# Patient Record
Sex: MALE | Race: WHITE | NOT HISPANIC OR LATINO | Employment: FULL TIME | URBAN - METROPOLITAN AREA
[De-identification: names, ages, dates, MRNs, and addresses within clinical notes are randomized per-mention and may not be internally consistent; named-entity substitution may affect disease eponyms.]

---

## 2019-02-05 ENCOUNTER — APPOINTMENT (EMERGENCY)
Dept: RADIOLOGY | Facility: HOSPITAL | Age: 39
End: 2019-02-05
Payer: COMMERCIAL

## 2019-02-05 ENCOUNTER — HOSPITAL ENCOUNTER (EMERGENCY)
Facility: HOSPITAL | Age: 39
Discharge: HOME | End: 2019-02-05
Attending: EMERGENCY MEDICINE | Admitting: EMERGENCY MEDICINE
Payer: COMMERCIAL

## 2019-02-05 ENCOUNTER — APPOINTMENT (EMERGENCY)
Dept: NON INVASIVE DIAGNOSTICS | Facility: HOSPITAL | Age: 39
End: 2019-02-05
Payer: COMMERCIAL

## 2019-02-05 VITALS
HEART RATE: 59 BPM | HEIGHT: 69 IN | RESPIRATION RATE: 18 BRPM | BODY MASS INDEX: 29.55 KG/M2 | SYSTOLIC BLOOD PRESSURE: 122 MMHG | TEMPERATURE: 98.4 F | DIASTOLIC BLOOD PRESSURE: 87 MMHG | WEIGHT: 199.52 LBS | OXYGEN SATURATION: 99 %

## 2019-02-05 DIAGNOSIS — I31.9 PERICARDITIS: Primary | ICD-10-CM

## 2019-02-05 LAB
ALBUMIN SERPL BCP-MCNC: 4 G/DL (ref 3.5–5)
ALP SERPL-CCNC: 78 U/L (ref 46–116)
ALT SERPL W P-5'-P-CCNC: 37 U/L (ref 12–78)
ANION GAP SERPL CALCULATED.3IONS-SCNC: 8 MMOL/L (ref 4–13)
AST SERPL W P-5'-P-CCNC: 26 U/L (ref 5–45)
ATRIAL RATE: 62 BPM
BASOPHILS # BLD AUTO: 0.02 THOUSANDS/ΜL (ref 0–0.1)
BASOPHILS NFR BLD AUTO: 0 % (ref 0–1)
BILIRUB SERPL-MCNC: 0.3 MG/DL (ref 0.2–1)
BUN SERPL-MCNC: 8 MG/DL (ref 5–25)
CALCIUM SERPL-MCNC: 9.1 MG/DL (ref 8.3–10.1)
CHLORIDE SERPL-SCNC: 103 MMOL/L (ref 100–108)
CO2 SERPL-SCNC: 29 MMOL/L (ref 21–32)
CREAT SERPL-MCNC: 0.95 MG/DL (ref 0.6–1.3)
EOSINOPHIL # BLD AUTO: 0.04 THOUSAND/ΜL (ref 0–0.61)
EOSINOPHIL NFR BLD AUTO: 1 % (ref 0–6)
ERYTHROCYTE [DISTWIDTH] IN BLOOD BY AUTOMATED COUNT: 13.5 % (ref 11.6–15.1)
GFR SERPL CREATININE-BSD FRML MDRD: 102 ML/MIN/1.73SQ M
GLUCOSE SERPL-MCNC: 97 MG/DL (ref 65–140)
HCT VFR BLD AUTO: 44.1 % (ref 36.5–49.3)
HGB BLD-MCNC: 14.6 G/DL (ref 12–17)
IMM GRANULOCYTES # BLD AUTO: 0.01 THOUSAND/UL (ref 0–0.2)
IMM GRANULOCYTES NFR BLD AUTO: 0 % (ref 0–2)
INR PPP: 1 (ref 0.86–1.17)
LYMPHOCYTES # BLD AUTO: 2.38 THOUSANDS/ΜL (ref 0.6–4.47)
LYMPHOCYTES NFR BLD AUTO: 43 % (ref 14–44)
MCH RBC QN AUTO: 30.5 PG (ref 26.8–34.3)
MCHC RBC AUTO-ENTMCNC: 33.1 G/DL (ref 31.4–37.4)
MCV RBC AUTO: 92 FL (ref 82–98)
MONOCYTES # BLD AUTO: 0.47 THOUSAND/ΜL (ref 0.17–1.22)
MONOCYTES NFR BLD AUTO: 9 % (ref 4–12)
NEUTROPHILS # BLD AUTO: 2.61 THOUSANDS/ΜL (ref 1.85–7.62)
NEUTS SEG NFR BLD AUTO: 47 % (ref 43–75)
NRBC BLD AUTO-RTO: 0 /100 WBCS
P AXIS: 65 DEGREES
PLATELET # BLD AUTO: 236 THOUSANDS/UL (ref 149–390)
PMV BLD AUTO: 10.5 FL (ref 8.9–12.7)
POTASSIUM SERPL-SCNC: 4.1 MMOL/L (ref 3.5–5.3)
PR INTERVAL: 190 MS
PROT SERPL-MCNC: 8 G/DL (ref 6.4–8.2)
PROTHROMBIN TIME: 13.1 SECONDS (ref 11.8–14.2)
QRS AXIS: 49 DEGREES
QRSD INTERVAL: 86 MS
QT INTERVAL: 370 MS
QTC INTERVAL: 375 MS
RBC # BLD AUTO: 4.79 MILLION/UL (ref 3.88–5.62)
SODIUM SERPL-SCNC: 140 MMOL/L (ref 136–145)
SPECIMEN SOURCE: NORMAL
T WAVE AXIS: 47 DEGREES
TROPONIN I BLD-MCNC: 0 NG/ML (ref 0–0.08)
TROPONIN I SERPL-MCNC: <0.02 NG/ML
TROPONIN I SERPL-MCNC: <0.02 NG/ML
TSH SERPL DL<=0.05 MIU/L-ACNC: 1.18 UIU/ML (ref 0.36–3.74)
VENTRICULAR RATE: 62 BPM
WBC # BLD AUTO: 5.53 THOUSAND/UL (ref 4.31–10.16)

## 2019-02-05 PROCEDURE — 93010 ELECTROCARDIOGRAM REPORT: CPT | Performed by: INTERNAL MEDICINE

## 2019-02-05 PROCEDURE — 93005 ELECTROCARDIOGRAM TRACING: CPT

## 2019-02-05 PROCEDURE — 36415 COLL VENOUS BLD VENIPUNCTURE: CPT | Performed by: EMERGENCY MEDICINE

## 2019-02-05 PROCEDURE — 84484 ASSAY OF TROPONIN QUANT: CPT | Performed by: EMERGENCY MEDICINE

## 2019-02-05 PROCEDURE — 84443 ASSAY THYROID STIM HORMONE: CPT | Performed by: EMERGENCY MEDICINE

## 2019-02-05 PROCEDURE — 93306 TTE W/DOPPLER COMPLETE: CPT | Performed by: INTERNAL MEDICINE

## 2019-02-05 PROCEDURE — 93306 TTE W/DOPPLER COMPLETE: CPT

## 2019-02-05 PROCEDURE — 99285 EMERGENCY DEPT VISIT HI MDM: CPT

## 2019-02-05 PROCEDURE — 71046 X-RAY EXAM CHEST 2 VIEWS: CPT

## 2019-02-05 PROCEDURE — 85610 PROTHROMBIN TIME: CPT | Performed by: EMERGENCY MEDICINE

## 2019-02-05 PROCEDURE — 85025 COMPLETE CBC W/AUTO DIFF WBC: CPT | Performed by: EMERGENCY MEDICINE

## 2019-02-05 PROCEDURE — 80053 COMPREHEN METABOLIC PANEL: CPT | Performed by: EMERGENCY MEDICINE

## 2019-02-05 PROCEDURE — 84484 ASSAY OF TROPONIN QUANT: CPT

## 2019-02-05 PROCEDURE — 86617 LYME DISEASE ANTIBODY: CPT | Performed by: EMERGENCY MEDICINE

## 2019-02-05 RX ORDER — IBUPROFEN 800 MG/1
800 TABLET ORAL 3 TIMES DAILY
Qty: 42 TABLET | Refills: 0 | Status: SHIPPED | OUTPATIENT
Start: 2019-02-05 | End: 2022-02-28

## 2019-02-05 RX ORDER — IBUPROFEN 600 MG/1
600 TABLET ORAL ONCE
Status: COMPLETED | OUTPATIENT
Start: 2019-02-05 | End: 2019-02-05

## 2019-02-05 RX ADMIN — IBUPROFEN 600 MG: 600 TABLET ORAL at 13:56

## 2019-02-05 NOTE — ED PROVIDER NOTES
History  Chief Complaint   Patient presents with    Syncope     patient reported a syncopal episode yesterday with an unknown down time  Patient reports dizziness, light headed and nausous  50-year-old male patient presents emergency department for evaluation of single syncopal episode yesterday while sitting a burst here  Patient states that he felt unwell, passed out, woke up sweaty  Today the patient started experiencing chest pains bilaterally and stated he felt that something was not right and came in for evaluation  An EKG was done at 0940 hours and shows p r  Depression into three AVF as well as in V2 V3 V4 V5 and V6  History provided by:  Patient   used: No    Syncope   Episode history:  Single  Most recent episode:  Yesterday  Timing:  Constant  Chronicity:  New  Context: not blood draw, not dehydration, not exertion and not medication change    Relieved by:  Nothing  Worsened by:  Nothing  Ineffective treatments:  None tried  Associated symptoms: no diaphoresis, no difficulty breathing, no nausea, no recent fall and no seizures        None       History reviewed  No pertinent past medical history  History reviewed  No pertinent surgical history  History reviewed  No pertinent family history  I have reviewed and agree with the history as documented  Social History   Substance Use Topics    Smoking status: Never Smoker    Smokeless tobacco: Never Used    Alcohol use No        Review of Systems   Constitutional: Negative for diaphoresis  Cardiovascular: Positive for syncope  Gastrointestinal: Negative for nausea  Neurological: Negative for seizures  All other systems reviewed and are negative  Physical Exam  Physical Exam   Constitutional: He is oriented to person, place, and time  He appears well-developed and well-nourished  No distress  HENT:   Head: Normocephalic and atraumatic     Right Ear: External ear normal    Left Ear: External ear normal    Eyes: Conjunctivae and EOM are normal  Right eye exhibits no discharge  Left eye exhibits no discharge  No scleral icterus  Neck: Normal range of motion  Neck supple  No JVD present  No tracheal deviation present  No thyromegaly present  Cardiovascular: Normal rate and regular rhythm  Pulmonary/Chest: Effort normal and breath sounds normal  No stridor  No respiratory distress  He has no wheezes  He has no rales  Abdominal: Soft  Bowel sounds are normal  He exhibits no distension  There is no tenderness  Musculoskeletal: Normal range of motion  He exhibits no edema, tenderness or deformity  Neurological: He is alert and oriented to person, place, and time  No cranial nerve deficit  Coordination normal    Skin: Skin is warm and dry  He is not diaphoretic  Psychiatric: He has a normal mood and affect  His behavior is normal    Nursing note and vitals reviewed  Vital Signs  ED Triage Vitals   Temperature Pulse Respirations Blood Pressure SpO2   02/05/19 0928 02/05/19 0928 02/05/19 0928 02/05/19 0928 02/05/19 0928   98 4 °F (36 9 °C) 62 16 167/92 100 %      Temp Source Heart Rate Source Patient Position - Orthostatic VS BP Location FiO2 (%)   02/05/19 0928 02/05/19 0928 02/05/19 1130 02/05/19 0928 --   Oral Monitor Lying Right arm       Pain Score       02/05/19 0928       No Pain           Vitals:    02/05/19 0928 02/05/19 1130 02/05/19 1200 02/05/19 1356   BP: 167/92 140/88 131/74 122/87   Pulse: 62 (!) 54 62 59   Patient Position - Orthostatic VS:  Lying Lying Sitting       Visual Acuity      ED Medications  Medications   ibuprofen (MOTRIN) tablet 600 mg (600 mg Oral Given 2/5/19 1356)       Diagnostic Studies  Results Reviewed     Procedure Component Value Units Date/Time    Lyme disease, western blot [[id number]] Collected:  02/05/19 1355    Lab Status:   In process Specimen:  Blood from Arm, Right Updated:  02/05/19 1402    Troponin I [[id number]]  (Normal) Collected:  02/05/19 1256 Lab Status:  Final result Specimen:  Blood from Arm, Right Updated:  02/05/19 1332     Troponin I <0 02 ng/mL     TSH, 3rd generation with Free T4 reflex [[id number]]  (Normal) Collected:  02/05/19 0951    Lab Status:  Final result Specimen:  Blood from Arm, Right Updated:  02/05/19 1025     TSH 3RD GENERATON 1 175 uIU/mL     Narrative:         Patients undergoing fluorescein dye angiography may retain small amounts of fluorescein in the body for 48-72 hours post procedure  Samples containing fluorescein can produce falsely depressed TSH values  If the patient had this procedure,a specimen should be resubmitted post fluorescein clearance  Comprehensive metabolic panel [[id number]] Collected:  02/05/19 0951    Lab Status:  Final result Specimen:  Blood from Arm, Right Updated:  02/05/19 1016     Sodium 140 mmol/L      Potassium 4 1 mmol/L      Chloride 103 mmol/L      CO2 29 mmol/L      ANION GAP 8 mmol/L      BUN 8 mg/dL      Creatinine 0 95 mg/dL      Glucose 97 mg/dL      Calcium 9 1 mg/dL      AST 26 U/L      ALT 37 U/L      Alkaline Phosphatase 78 U/L      Total Protein 8 0 g/dL      Albumin 4 0 g/dL      Total Bilirubin 0 30 mg/dL      eGFR 102 ml/min/1 73sq m     Narrative:         National Kidney Disease Education Program recommendations are as follows:  GFR calculation is accurate only with a steady state creatinine  Chronic Kidney disease less than 60 ml/min/1 73 sq  meters  Kidney failure less than 15 ml/min/1 73 sq  meters      Troponin I [[id number]]  (Normal) Collected:  02/05/19 0951    Lab Status:  Final result Specimen:  Blood from Arm, Right Updated:  02/05/19 1015     Troponin I <0 02 ng/mL     Protime-INR [[id number]]  (Normal) Collected:  02/05/19 0951    Lab Status:  Final result Specimen:  Blood from Arm, Right Updated:  02/05/19 1008     Protime 13 1 seconds      INR 1 00    POCT troponin [[id number]] Collected:  02/05/19 0945    Lab Status:  Final result Updated:  02/05/19 0958     POC Troponin I 0 00 ng/ml      Specimen Type VENOUS    Narrative:         [employer] i-Stat handheld analyzer 99% cutoff is > 0 08ng/mL in network Emergency Departments    o cTnI 99% cutoff is useful only when applied to patients in the clinical setting of myocardial ischemia  o cTnI 99% cutoff should be interpreted in the context of clinical history, ECG findings and possibly cardiac imaging to establish correct diagnosis  o cTnI 99% cutoff may be suggestive but clearly not indicative of a coronary event without the clinical setting of myocardial ischemia  CBC and differential [[id number]] Collected:  02/05/19 0951    Lab Status:  Final result Specimen:  Blood from Arm, Right Updated:  02/05/19 0957     WBC 5 53 Thousand/uL      RBC 4 79 Million/uL      Hemoglobin 14 6 g/dL      Hematocrit 44 1 %      MCV 92 fL      MCH 30 5 pg      MCHC 33 1 g/dL      RDW 13 5 %      MPV 10 5 fL      Platelets 942 Thousands/uL      nRBC 0 /100 WBCs      Neutrophils Relative 47 %      Immat GRANS % 0 %      Lymphocytes Relative 43 %      Monocytes Relative 9 %      Eosinophils Relative 1 %      Basophils Relative 0 %      Neutrophils Absolute 2 61 Thousands/µL      Immature Grans Absolute 0 01 Thousand/uL      Lymphocytes Absolute 2 38 Thousands/µL      Monocytes Absolute 0 47 Thousand/µL      Eosinophils Absolute 0 04 Thousand/µL      Basophils Absolute 0 02 Thousands/µL                  XR chest 2 views   Final Result by Paola Colby MD (02/05 1033)      No acute cardiopulmonary disease              Workstation performed: [id number]                    Procedures  Procedures       Phone Contacts  ED Phone Contact    ED Course                               MDM  Number of Diagnoses or Management Options  Pericarditis: new and requires workup     Amount and/or Complexity of Data Reviewed  Clinical lab tests: ordered and reviewed  Tests in the radiology section of CPT®: reviewed and ordered  Decide to obtain previous medical records or to obtain history from someone other than the patient: yes  Review and summarize past medical records: yes    Patient Progress  Patient progress: stable      Disposition  Final diagnoses:   Pericarditis     Time reflects when diagnosis was documented in both MDM as applicable and the Disposition within this note     Time User Action Codes Description Comment    2/5/2019  1:42 PM Simon Litten Add [I31 9] Pericarditis       ED Disposition     ED Disposition Condition Date/Time Comment    Discharge  Tue Feb 5, 2019  1:42 PM Rolly graf discharge to home/self care  Condition at discharge: Good        Follow-up Information     Follow up With Specialties Details Why Contact Info Additional Information    [facility] Emergency Medicine   [address] [facility] [address]  [phone] [place] ED, [address] [facility], [facility] Rebeca Call MD Cardiology   [address] [place] [address], Nurse Practitioner   21   [address] [facility]  [address]  [phone]             There are no discharge medications for this patient  No discharge procedures on file      ED Provider  Electronically Signed by           Sunita Art DO  02/05/19 3497

## 2019-02-05 NOTE — DISCHARGE INSTRUCTIONS
Acute Pericarditis   WHAT YOU NEED TO KNOW:   Acute pericarditis is inflammation of the pericardium  The pericardium is the thin sac that surrounds your heart  A small amount of clear fluid between the heart and the sac allows the heart to beat easily  With acute pericarditis, the amount of fluid increases and may contain pus  This can cause problems with the way that your heart beats  DISCHARGE INSTRUCTIONS:   Medicines:   · NSAIDs  help decrease swelling and pain or fever  This medicine is available with or without a doctor's order  NSAIDs can cause stomach bleeding or kidney problems in certain people  If you take blood thinner medicine, always ask your healthcare provider if NSAIDs are safe for you  Always read the medicine label and follow directions  · Antibiotics: This medicine will help fight or prevent an infection  Take your antibiotics until they are gone, even if you feel better  · Steroids: This medicine may be given to decrease redness, pain, and swelling  · Take your medicine as directed  Contact your healthcare provider if you think your medicine is not helping or if you have side effects  Tell him of her if you are allergic to any medicine  Keep a list of the medicines, vitamins, and herbs you take  Include the amounts, and when and why you take them  Bring the list or the pill bottles to follow-up visits  Carry your medicine list with you in case of an emergency  Follow up with your healthcare provider as directed:  Write down your questions so you remember to ask them during your visits  Wellness tips:   · Eat a variety of healthy foods: This may help you have more energy and heal faster  Healthy foods include fruit, vegetables, whole-grain breads, low-fat dairy products, beans, lean meat, and fish  Ask if you need to be on a special diet  · Drink liquids as directed: Adults should drink between 9 and 13 eight-ounce cups of liquid every day  Ask what amount is best for you  For most people, good liquids to drink are water, juice, and milk  · Get plenty of exercise:  Talk to your caregiver about the best exercise plan for you  Exercise can decrease your blood pressure and improve your health  · Do not smoke: If you smoke, it is never too late to quit  You are more likely to have heart disease, lung disease, cancer, and other health problems if you smoke  Quitting smoking will improve your health and the health of those around you  If you smoke, ask for information about how to stop  · Manage stress:  Stress may slow healing and cause illness  Learn new ways to relax, such as deep breathing  Contact your healthcare provider if:   · You have a fever  · You have questions or concerns about your condition or care  Return to the emergency department if:   · You have shortness of breath, which is worse when you lie down  · Your chest pain gets worse or does not get better  © 2017 [employer]  Information is for End User's use only and may not be sold, redistributed or otherwise used for commercial purposes  All illustrations and images included in CareNotes® are the copyrighted property of [employer]  or Rangel Rouse  The above information is an  only  It is not intended as medical advice for individual conditions or treatments  Talk to your doctor, nurse or pharmacist before following any medical regimen to see if it is safe and effective for you

## 2019-02-06 LAB
B BURGDOR IGG PATRN SER IB-IMP: NEGATIVE
B BURGDOR IGM PATRN SER IB-IMP: NEGATIVE
B BURGDOR18KD IGG SER QL IB: ABNORMAL
B BURGDOR23KD IGG SER QL IB: ABNORMAL
B BURGDOR23KD IGM SER QL IB: PRESENT
B BURGDOR28KD IGG SER QL IB: ABNORMAL
B BURGDOR30KD IGG SER QL IB: ABNORMAL
B BURGDOR39KD IGG SER QL IB: ABNORMAL
B BURGDOR39KD IGM SER QL IB: ABNORMAL
B BURGDOR41KD IGG SER QL IB: ABNORMAL
B BURGDOR41KD IGM SER QL IB: ABNORMAL
B BURGDOR45KD IGG SER QL IB: ABNORMAL
B BURGDOR58KD IGG SER QL IB: ABNORMAL
B BURGDOR66KD IGG SER QL IB: ABNORMAL
B BURGDOR93KD IGG SER QL IB: ABNORMAL

## 2019-02-14 ENCOUNTER — OFFICE VISIT (OUTPATIENT)
Dept: CARDIOLOGY CLINIC | Facility: CLINIC | Age: 39
End: 2019-02-14
Payer: COMMERCIAL

## 2019-02-14 VITALS
DIASTOLIC BLOOD PRESSURE: 96 MMHG | SYSTOLIC BLOOD PRESSURE: 124 MMHG | BODY MASS INDEX: 30.36 KG/M2 | OXYGEN SATURATION: 95 % | HEART RATE: 65 BPM | WEIGHT: 205 LBS | HEIGHT: 69 IN

## 2019-02-14 DIAGNOSIS — R07.9 CHEST PAIN, UNSPECIFIED TYPE: ICD-10-CM

## 2019-02-14 DIAGNOSIS — R55 SYNCOPE, UNSPECIFIED SYNCOPE TYPE: Primary | ICD-10-CM

## 2019-02-14 PROCEDURE — 99244 OFF/OP CNSLTJ NEW/EST MOD 40: CPT | Performed by: INTERNAL MEDICINE

## 2019-02-14 NOTE — PROGRESS NOTES
CARDIOLOGY OFFICE VISIT  [facility]  [facility], [facility], [address] [facility], [address], [address]  Tel: [phone]      NAME: Carlos Medellin  AGE: 41 y o  SEX: male  : 1980   MRN: [id number]      Chief Complaint:  Chief Complaint   Patient presents with    Follow-up     ER follow-up, passed out  Fatigue         History of Present Illness:   Ref by [facility] ER physician for cardiac consult for syncope and CP  Patient states he was sitting in the lomeli chair last Monday when he felt very weak and then his head slumped forward and he passed out  Woke up a few minutes later without confusion  Denies tongue bite, abnormal body movements, bladder or bowel incontinence  Following regaining senses he was sweating profusely  Denies premonitory chest pain, palpitations, SOB  Next day he felt some tightness in his upper chest with tiredness and body pain  Went to the ER where a diagnosis of pericarditis was made and an echocardiogram was done and patient was precribed Motrin 800 mg t i d  For 2 weeks    States in early 2018 also he had a syncopal episode but that time he states he had not eaten food so he blamed it on that  Patient states he works as a  on 14 hour shifts and denies chest pain / pressure, SOB, palpitations, swelling feet, orthopnea, PND, claudication        Past Medical History:  None      Family History:  No history of CAD in family      Social History:  Social History     Socioeconomic History    Marital status: /Civil Union     Spouse name: Not on file    Number of children: Not on file    Years of education: Not on file    Highest education level: Not on file   Occupational History    Not on file   Social Needs    Financial resource strain: Not on file    Food insecurity:     Worry: Not on file     Inability: Not on file    Transportation needs:     Medical: Not on file     Non-medical: Not on file   Tobacco Use  Smoking status: Never Smoker    Smokeless tobacco: Never Used   Substance and Sexual Activity    Alcohol use: No    Drug use: Yes     Frequency: 7 0 times per week     Types: Marijuana    Sexual activity: Not on file   Lifestyle    Physical activity:     Days per week: Not on file     Minutes per session: Not on file    Stress: Not on file   Relationships    Social connections:     Talks on phone: Not on file     Gets together: Not on file     Attends Holiness service: Not on file     Active member of club or organization: Not on file     Attends meetings of clubs or organizations: Not on file     Relationship status: Not on file    Intimate partner violence:     Fear of current or ex partner: Not on file     Emotionally abused: Not on file     Physically abused: Not on file     Forced sexual activity: Not on file   Other Topics Concern    Not on file   Social History Narrative    Not on file       The following portions of the patient's history were reviewed and updated as appropriate: past medical history, past surgical history, past family history,  past social history, current medications, allergies list       Review of Systems:  Constitutional: Denies fever, chills, fatigue  Eyes: Denies eye redness, eye discharge, double vision  ENT: Denies hearing loss, tinnitus, sneezing, nasal discharge, sore throat   Respiratory: Denies cough, expectoration, hemoptysis, shortness of breath  Cardiovascular: Denies palpitations, orthopnea, PND, lower extremity swelling  Gastrointestinal: Denies abdominal pain, nausea, vomiting, hematemesis, diarrhea, bloody stools  Genito-Urinary: Denies dysuria, incontinence  Musculoskeletal: Denies back pain, joint pain, muscle pain  Neurologic: Denies confusion, headache, focal weakness, sensory changes, seizures  Endocrine: Denies polyuria, polydipsia, temperature intolerance  Allergy and Immunology: Denies hives, insect bite sensitivity  Hematological and Lymphatic: Denies bleeding problems, swollen glands   Psychological: Denies depression, suicidal ideation, anxiety, panic  Dermatological: Denies pruritus, rash, skin lesion changes      Vitals:  Vitals:    02/14/19 0831   BP: 124/96   Pulse: 65   SpO2: 95%       Body mass index is 30 27 kg/m²  Weight (last 2 days)     Date/Time   Weight    02/14/19 0831   93 (205)                Physical Examination:  General: Patient is not in acute distress  Awake, alert, oriented in time, place and person  Responding to commands  Head: Normocephalic  Atraumatic  Eyes: Both pupils normal sized, round and reactive to light  Nonicteric  ENT: Normal external ear canals  Nares normal, no drainage  Lips and oral mucosa normal  Neck: Supple  JVP not raised  Trachea central  No thyromegaly  Lungs: Bilateral bronchovascular breath sounds with no crackles or rhonchi  Chest wall: No tenderness  Cardiovascular: RRR  S1 and S2 normal  No murmur, rub or gallop  Gastrointestinal: Abdomen soft, nontender  No guarding or rigidity  Liver and spleen not palpable  Bowel sounds present  Neurologic: Patient is awake, alert, oriented in time, place and person  Responding to command  Moving all extremities  Integumentary:  No skin rash  Lymphatic: No cervical lymphadenopathy  Back: Symmetric   No CVA tenderness  Extremities: No clubbing, cyanosis or edema      Laboratory Results:  CBC with diff:   Lab Results   Component Value Date    WBC 5 53 02/05/2019    RBC 4 79 02/05/2019    HGB 14 6 02/05/2019    HCT 44 1 02/05/2019    MCV 92 02/05/2019    MCH 30 5 02/05/2019    RDW 13 5 02/05/2019     02/05/2019       CMP:  Lab Results   Component Value Date    CREATININE 0 95 02/05/2019    BUN 8 02/05/2019    K 4 1 02/05/2019     02/05/2019    CO2 29 02/05/2019    ALKPHOS 78 02/05/2019    ALT 37 02/05/2019    AST 26 02/05/2019       Lab Results   Component Value Date    TROPONINI <0 02 02/05/2019    TROPONINI <0 02 02/05/2019       Cardiac testing: Results for orders placed during the hospital encounter of 19   Echo complete with contrast if indicated    Narrative [address] [place] [id number] [phone]    Transthoracic Echocardiogram  2D, M-mode, Doppler, and Color Doppler    Study date:  2019    Patient: Will Saavedra  MR number: [id number]  Account number: [de-identified]  : 1980  Age: 41 years  Gender: Male  Status: Emergency  Location: Bedside  Height: 69 in  Weight: 199 lb  BP: 167/ 92 mmHg    Indications: Pericarditis, Assess left ventricular function  Diagnoses: I32  - Pericarditis in diseases classified elsewhere    Sonographer:  Mariama Ledezma, [address] [facility]  Referring Physician:  Volodymyr Leon DO  Group:  [facility]  Interpreting Physician:  William Martin MD    SUMMARY    LEFT VENTRICLE:  Ejection fraction was estimated to be 60 %  There were no regional wall motion abnormalities  TRICUSPID VALVE:  There was trace regurgitation  HISTORY: PRIOR HISTORY: Patient has no history of cardiovascular disease  PROCEDURE: The procedure was performed at the bedside  This was a routine study  The transthoracic approach was used  The study included complete 2D imaging, M-mode, complete spectral Doppler, and color Doppler  The heart rate was 55 bpm,  at the start of the study  Images were obtained from the parasternal, apical, subcostal, and suprasternal notch acoustic windows  Image quality was adequate  LEFT VENTRICLE: Size was normal  Ejection fraction was estimated to be 60 %  There were no regional wall motion abnormalities  DOPPLER: Left ventricular diastolic function parameters were normal     RIGHT VENTRICLE: The size was normal  Systolic function was normal  Wall thickness was normal     LEFT ATRIUM: Size was normal     RIGHT ATRIUM: Size was normal     MITRAL VALVE: Valve structure was normal  There was normal leaflet separation   DOPPLER: The transmitral velocity was within the normal range  There was no evidence for stenosis  There was no regurgitation  AORTIC VALVE: The valve was trileaflet  Leaflets exhibited normal thickness and normal cuspal separation  DOPPLER: Transaortic velocity was within the normal range  There was no evidence for stenosis  There was no regurgitation  TRICUSPID VALVE: The valve structure was normal  There was normal leaflet separation  DOPPLER: The transtricuspid velocity was within the normal range  There was no evidence for stenosis  There was trace regurgitation  PULMONIC VALVE: Leaflets exhibited normal thickness, no calcification, and normal cuspal separation  DOPPLER: The transpulmonic velocity was within the normal range  There was no regurgitation  PERICARDIUM: There was no pericardial effusion  The pericardium was normal in appearance  AORTA: The root exhibited normal size  SYSTEM MEASUREMENT TABLES    2D  %FS: 30 4 %  Ao Diam: 3 5 cm  EDV(Teich): 116 3 ml  EF(Teich): 57 6 %  ESV(Teich): 49 4 ml  IVSd: 0 8 cm  LA Area: 13 6 cm2  LA Diam: 2 5 cm  LVEDV MOD A4C: 116 1 ml  LVEF MOD A4C: 70 3 %  LVESV MOD A4C: 34 5 ml  LVIDd: 5 cm  LVIDs: 3 5 cm  LVLd A4C: 9 2 cm  LVLs A4C: 7 6 cm  LVPWd: 0 8 cm  RA Area: 15 cm2  RVIDd: 2 7 cm  SV MOD A4C: 81 6 ml  SV(Teich): 67 ml    CW  TR Vmax: 2 m/s  TR maxP 2 mmHg    MM  TAPSE: 1 8 cm    PW  E': 0 1 m/s  E/E': 5  MV A [place]: 0 4 m/s  MV Dec [place]: 1 9 m/s2  MV DecT: 328 2 ms  MV E [place]: 0 6 m/s  MV E/A Ratio: 1 5  MV PHT: 95 2 ms  MVA By PHT: 2 3 cm2    [facility] Accredited Echocardiography Laboratory    Prepared and electronically signed by    Solitario Hansen MD  Signed 2019 11:00:34         EKG:  Sinus arrhythmia   ST elevation consider early repolarization, pericarditis or injury    Medications:    Current Outpatient Medications:     ibuprofen (MOTRIN) 800 mg tablet, Take 1 tablet (800 mg total) by mouth 3 (three) times a day for 14 days, Disp: 42 tablet, Rfl: 0      Allergies:  No Known Allergies      Assessment and Plan:  1  Syncope, unspecified syncope type  EKG and 2D echocardiogram reports reviewed with the patient in detail  Event monitor ordered for evaluation  Stress test ordered for evaluation  Patient asked to keep himself well hydrated all the time    2  Chest pain, unspecified type  Stress test only, exercise, ordered for evaluation    Recommend aggressive risk factor modification and therapeutic lifestyle changes  Low-salt, low-calorie, low-fat, low-cholesterol diet and to optimize weight  I will defer the ordering and monitoring of necessity lab studies to you, but I am available and happy to review and manage any of the data at your request in the future  Discussed concepts of atherosclerosis, including signs and symptoms of cardiac disease  Previous studies were reviewed  Safety measures were reviewed  Questions were entertained and answered  Patient was advised to report any problems requiring medical attention  Follow-up with PCP and appropriate specialist and lab work as discussed  Return for follow up visit as scheduled or earlier, if needed  Thank you for allowing me to participate in the care and evaluation of your patient  Should you have any questions, please feel free to contact me        Bridger Small MD  2/14/2019,9:03 AM

## 2019-02-18 ENCOUNTER — HOSPITAL ENCOUNTER (OUTPATIENT)
Dept: NON INVASIVE DIAGNOSTICS | Facility: CLINIC | Age: 39
Discharge: HOME | End: 2019-02-18
Payer: COMMERCIAL

## 2019-02-18 DIAGNOSIS — R07.9 CHEST PAIN, UNSPECIFIED TYPE: ICD-10-CM

## 2019-02-18 LAB
ARRHY DURING EX: NORMAL
CHEST PAIN STATEMENT: NORMAL
MAX DIASTOLIC BP: 78 MMHG
MAX HEART RATE: 171 BPM
MAX PREDICTED HEART RATE: 183 BPM
MAX. SYSTOLIC BP: 162 MMHG
PROTOCOL NAME: NORMAL
REASON FOR TERMINATION: NORMAL
TARGET HR FORMULA: NORMAL
TEST INDICATION: NORMAL
TIME IN EXERCISE PHASE: NORMAL

## 2019-02-18 PROCEDURE — 93016 CV STRESS TEST SUPVJ ONLY: CPT | Performed by: INTERNAL MEDICINE

## 2019-02-18 PROCEDURE — 93017 CV STRESS TEST TRACING ONLY: CPT

## 2019-02-18 PROCEDURE — 93018 CV STRESS TEST I&R ONLY: CPT | Performed by: INTERNAL MEDICINE

## 2019-02-19 ENCOUNTER — TELEPHONE (OUTPATIENT)
Dept: CARDIOLOGY CLINIC | Facility: CLINIC | Age: 39
End: 2019-02-19

## 2019-02-19 NOTE — TELEPHONE ENCOUNTER
Call was transferred to me for an appointment  Patient didn't want an appt he wanted a letter  Ktalyn Feldman He told me that he is wearing an event monitor for the next 2 weeks  He said is job is giving him a hard time and they want a letter from Dr Douglas Lamb that he can be wearing the holter at work  He said that his job told him if he had to wear it he had to stay home  I told him the point of the monitor was to go about your daily activity so we can track what is happening  I asked him what he does and he just said he is all over the place   Call him at this #  [phone] if you need more info   Patient said he will come pic the letter up when it is ready

## 2019-02-22 NOTE — TELEPHONE ENCOUNTER
Pt stopped in the office with paperwork from his job that he needs filled out because his job is trying to make him take FMLA, pt would also like letter stating that he can work as well  Please call when ncomplete after Dr Douglas Lamb comes back

## 2019-02-28 NOTE — TELEPHONE ENCOUNTER
The FMLA form was in the folder- do you have it with you? If so can you fax the form do we can use it for the letter  His job will not accept our normal return to work letter, since wearing the EM is the issue

## 2019-02-28 NOTE — TELEPHONE ENCOUNTER
Pt stopped in the office today to check status of the note, he said he really needs to go back to work  Can temporary note be made by MA so pt has something to give his employer stating that he can work?

## 2019-03-01 ENCOUNTER — TELEPHONE (OUTPATIENT)
Dept: CARDIOLOGY CLINIC | Facility: CLINIC | Age: 39
End: 2019-03-01

## 2019-03-01 NOTE — TELEPHONE ENCOUNTER
Called patient and advised him [employer] Sal[employer] is working on his FMLA paperwork and will give him a call back when ready  Pt was also advised Dr Ashley Khan had a temporary letter if he would like to come   Pt would like us to fax letter [place], [place] to [phone]

## 2019-03-01 NOTE — TELEPHONE ENCOUNTER
Pt called regarding status of FMLA paperwork  Pt stated that the date he had the monitor on was 2/18/19

## 2019-03-01 NOTE — TELEPHONE ENCOUNTER
I have it with me but nothing is filled up --- dates etc  Will leave it with you tomorrow   Ask from pt and also fill in the details from his chart and I will sign it

## 2019-03-01 NOTE — TELEPHONE ENCOUNTER
[place] called form [place] - stated that they received the letter form our office  She stated that patient was NOT told that he could not work with the monitor on, they are aware that patients need to go about their daily routines while being monitored  She stated that the patient told them that we put him on "heavy duty" medications that were making him "groggy"  I advised her that we did not give patient any medication  Only medication was given to him in the ER  I advised her that the FMLA paperwork was filled out the same way as the letter based on information that we were given form the patient  She stated that she will call and speak with him regarding this issue  At this time there is no need to complete paperwork for FMLA

## 2019-03-01 NOTE — TELEPHONE ENCOUNTER
I wrote a temporary note for the patient to have until [facility] paperwork can be completed  It is in the folder for review and signature  I can give to patient or fax it to patients employer  Also per other task patient - started monitoring on 2/18/19  Ordered for 2 weeks - end date should be on 3/4/19

## 2019-03-18 ENCOUNTER — DOCUMENTATION (OUTPATIENT)
Dept: CARDIOLOGY CLINIC | Facility: CLINIC | Age: 39
End: 2019-03-18

## 2022-02-27 ENCOUNTER — APPOINTMENT (EMERGENCY)
Dept: CT IMAGING | Facility: HOSPITAL | Age: 42
End: 2022-02-27
Payer: COMMERCIAL

## 2022-02-27 ENCOUNTER — HOSPITAL ENCOUNTER (EMERGENCY)
Facility: HOSPITAL | Age: 42
Discharge: HOME | End: 2022-02-27
Attending: EMERGENCY MEDICINE | Admitting: EMERGENCY MEDICINE
Payer: COMMERCIAL

## 2022-02-27 VITALS
SYSTOLIC BLOOD PRESSURE: 118 MMHG | RESPIRATION RATE: 17 BRPM | OXYGEN SATURATION: 99 % | TEMPERATURE: 98.3 F | WEIGHT: 200.18 LBS | DIASTOLIC BLOOD PRESSURE: 76 MMHG | BODY MASS INDEX: 29.56 KG/M2 | HEART RATE: 62 BPM

## 2022-02-27 DIAGNOSIS — R56.9 SEIZURE-LIKE ACTIVITY (HCC): ICD-10-CM

## 2022-02-27 DIAGNOSIS — R55 SYNCOPE: Primary | ICD-10-CM

## 2022-02-27 LAB
ALBUMIN SERPL BCP-MCNC: 3.6 G/DL (ref 3.5–5)
ALP SERPL-CCNC: 82 U/L (ref 46–116)
ALT SERPL W P-5'-P-CCNC: 45 U/L (ref 12–78)
AMPHETAMINES SERPL QL SCN: NEGATIVE
ANION GAP SERPL CALCULATED.3IONS-SCNC: 7 MMOL/L (ref 4–13)
AST SERPL W P-5'-P-CCNC: 21 U/L (ref 5–45)
ATRIAL RATE: 66 BPM
BARBITURATES UR QL: NEGATIVE
BASOPHILS # BLD AUTO: 0.02 THOUSANDS/ΜL (ref 0–0.1)
BASOPHILS NFR BLD AUTO: 0 % (ref 0–1)
BENZODIAZ UR QL: NEGATIVE
BILIRUB DIRECT SERPL-MCNC: 0.08 MG/DL (ref 0–0.2)
BILIRUB SERPL-MCNC: 0.27 MG/DL (ref 0.2–1)
BUN SERPL-MCNC: 8 MG/DL (ref 5–25)
CALCIUM SERPL-MCNC: 8.6 MG/DL (ref 8.3–10.1)
CARDIAC TROPONIN I PNL SERPL HS: 2 NG/L
CHLORIDE SERPL-SCNC: 105 MMOL/L (ref 100–108)
CO2 SERPL-SCNC: 28 MMOL/L (ref 21–32)
COCAINE UR QL: NEGATIVE
CREAT SERPL-MCNC: 0.88 MG/DL (ref 0.6–1.3)
EOSINOPHIL # BLD AUTO: 0.05 THOUSAND/ΜL (ref 0–0.61)
EOSINOPHIL NFR BLD AUTO: 1 % (ref 0–6)
ERYTHROCYTE [DISTWIDTH] IN BLOOD BY AUTOMATED COUNT: 13.4 % (ref 11.6–15.1)
GFR SERPL CREATININE-BSD FRML MDRD: 107 ML/MIN/1.73SQ M
GLUCOSE SERPL-MCNC: 93 MG/DL (ref 65–140)
HCT VFR BLD AUTO: 41.8 % (ref 36.5–49.3)
HGB BLD-MCNC: 14 G/DL (ref 12–17)
IMM GRANULOCYTES # BLD AUTO: 0.01 THOUSAND/UL (ref 0–0.2)
IMM GRANULOCYTES NFR BLD AUTO: 0 % (ref 0–2)
LYMPHOCYTES # BLD AUTO: 1.9 THOUSANDS/ΜL (ref 0.6–4.47)
LYMPHOCYTES NFR BLD AUTO: 41 % (ref 14–44)
MAGNESIUM SERPL-MCNC: 2.1 MG/DL (ref 1.6–2.6)
MCH RBC QN AUTO: 30 PG (ref 26.8–34.3)
MCHC RBC AUTO-ENTMCNC: 33.5 G/DL (ref 31.4–37.4)
MCV RBC AUTO: 90 FL (ref 82–98)
METHADONE UR QL: NEGATIVE
MONOCYTES # BLD AUTO: 0.47 THOUSAND/ΜL (ref 0.17–1.22)
MONOCYTES NFR BLD AUTO: 10 % (ref 4–12)
NEUTROPHILS # BLD AUTO: 2.17 THOUSANDS/ΜL (ref 1.85–7.62)
NEUTS SEG NFR BLD AUTO: 48 % (ref 43–75)
NRBC BLD AUTO-RTO: 0 /100 WBCS
OPIATES UR QL SCN: NEGATIVE
OXYCODONE+OXYMORPHONE UR QL SCN: NEGATIVE
P AXIS: 78 DEGREES
PCP UR QL: NEGATIVE
PLATELET # BLD AUTO: 248 THOUSANDS/UL (ref 149–390)
PMV BLD AUTO: 10.3 FL (ref 8.9–12.7)
POTASSIUM SERPL-SCNC: 3.8 MMOL/L (ref 3.5–5.3)
PR INTERVAL: 182 MS
PROT SERPL-MCNC: 7.2 G/DL (ref 6.4–8.2)
QRS AXIS: 78 DEGREES
QRSD INTERVAL: 90 MS
QT INTERVAL: 352 MS
QTC INTERVAL: 369 MS
RBC # BLD AUTO: 4.66 MILLION/UL (ref 3.88–5.62)
SODIUM SERPL-SCNC: 140 MMOL/L (ref 136–145)
T WAVE AXIS: 72 DEGREES
THC UR QL: POSITIVE
TSH SERPL DL<=0.05 MIU/L-ACNC: 1.28 UIU/ML (ref 0.36–3.74)
VENTRICULAR RATE: 66 BPM
WBC # BLD AUTO: 4.62 THOUSAND/UL (ref 4.31–10.16)

## 2022-02-27 PROCEDURE — 80048 BASIC METABOLIC PNL TOTAL CA: CPT | Performed by: EMERGENCY MEDICINE

## 2022-02-27 PROCEDURE — 85025 COMPLETE CBC W/AUTO DIFF WBC: CPT | Performed by: EMERGENCY MEDICINE

## 2022-02-27 PROCEDURE — 84443 ASSAY THYROID STIM HORMONE: CPT | Performed by: EMERGENCY MEDICINE

## 2022-02-27 PROCEDURE — 93005 ELECTROCARDIOGRAM TRACING: CPT

## 2022-02-27 PROCEDURE — 99284 EMERGENCY DEPT VISIT MOD MDM: CPT

## 2022-02-27 PROCEDURE — 83735 ASSAY OF MAGNESIUM: CPT | Performed by: EMERGENCY MEDICINE

## 2022-02-27 PROCEDURE — 80076 HEPATIC FUNCTION PANEL: CPT | Performed by: EMERGENCY MEDICINE

## 2022-02-27 PROCEDURE — 93010 ELECTROCARDIOGRAM REPORT: CPT | Performed by: INTERNAL MEDICINE

## 2022-02-27 PROCEDURE — 99285 EMERGENCY DEPT VISIT HI MDM: CPT | Performed by: EMERGENCY MEDICINE

## 2022-02-27 PROCEDURE — 36415 COLL VENOUS BLD VENIPUNCTURE: CPT | Performed by: EMERGENCY MEDICINE

## 2022-02-27 PROCEDURE — 84484 ASSAY OF TROPONIN QUANT: CPT | Performed by: EMERGENCY MEDICINE

## 2022-02-27 PROCEDURE — G1004 CDSM NDSC: HCPCS

## 2022-02-27 PROCEDURE — 80307 DRUG TEST PRSMV CHEM ANLYZR: CPT | Performed by: EMERGENCY MEDICINE

## 2022-02-27 PROCEDURE — 70450 CT HEAD/BRAIN W/O DYE: CPT

## 2022-02-27 NOTE — ED PROVIDER NOTES
History  Chief Complaint   Patient presents with    Syncope     Pt states when at friends house yesterday to have had 2 episodes of "blacking out" friends report pt "eyes rolled in back of head" pt does not recall episodes  pt denies hx of seizures      HPI    Prior to Admission Medications   Prescriptions Last Dose Informant Patient Reported? Taking?   ibuprofen (MOTRIN) 800 mg tablet   No No   Sig: Take 1 tablet (800 mg total) by mouth 3 (three) times a day for 14 days      Facility-Administered Medications: None       History reviewed  No pertinent past medical history  History reviewed  No pertinent surgical history  History reviewed  No pertinent family history  I have reviewed and agree with the history as documented  E-Cigarette/Vaping     E-Cigarette/Vaping Substances     Social History     Tobacco Use    Smoking status: Never Smoker    Smokeless tobacco: Never Used   Substance Use Topics    Alcohol use: Yes     Comment: occasionally    Drug use: Yes     Frequency: 7 0 times per week     Types: Marijuana       Review of Systems    Physical Exam  Physical Exam  Vitals and nursing note reviewed  Constitutional:       General: He is not in acute distress  Appearance: He is well-developed  HENT:      Head: Normocephalic and atraumatic  Eyes:      Conjunctiva/sclera: Conjunctivae normal       Pupils: Pupils are equal, round, and reactive to light  Neck:      Trachea: No tracheal deviation  Cardiovascular:      Rate and Rhythm: Normal rate and regular rhythm  Heart sounds: Normal heart sounds  Pulmonary:      Effort: Pulmonary effort is normal  No respiratory distress  Breath sounds: Normal breath sounds  Chest:      Chest wall: No lacerations, deformity, tenderness, crepitus or edema  Abdominal:      General: There is no distension  Palpations: Abdomen is soft  Tenderness: There is no abdominal tenderness     Musculoskeletal:         General: No tenderness or deformity  Normal range of motion  Cervical back: Full passive range of motion without pain, normal range of motion and neck supple  No spinous process tenderness or muscular tenderness  Normal range of motion  Right lower leg: No edema  Left lower leg: No edema  Skin:     General: Skin is warm and dry  Findings: No abrasion, bruising, ecchymosis or laceration  Neurological:      Mental Status: He is alert and oriented to person, place, and time  GCS: GCS eye subscore is 4  GCS verbal subscore is 5  GCS motor subscore is 6  Cranial Nerves: Cranial nerves are intact  No cranial nerve deficit, dysarthria or facial asymmetry  Sensory: Sensation is intact  No sensory deficit  Motor: Motor function is intact  No weakness        Coordination: Finger-Nose-Finger Test and Heel to [place] mitchell Test normal       Gait: Gait normal    Psychiatric:         Behavior: Behavior normal          Vital Signs  ED Triage Vitals [02/27/22 1040]   Temperature Pulse Respirations Blood Pressure SpO2   98 3 °F (36 8 °C) 60 18 138/87 99 %      Temp Source Heart Rate Source Patient Position - Orthostatic VS BP Location FiO2 (%)   Oral Monitor Lying Right arm --      Pain Score       --           Vitals:    02/27/22 1040 02/27/22 1145 02/27/22 1348   BP: 138/87 116/73 118/76   Pulse: 60 (!) 54 62   Patient Position - Orthostatic VS: Lying Lying Lying         Visual Acuity  Visual Acuity      Most Recent Value   L Pupil Size (mm) 2   R Pupil Size (mm) 2          ED Medications  Medications - No data to display    Diagnostic Studies  Results Reviewed     Procedure Component Value Units Date/Time    Rapid drug screen, urine [[id number]]  (Abnormal) Collected: 02/27/22 1212    Lab Status: Final result Specimen: Urine, Clean Catch Updated: 02/27/22 1242     Amph/Meth UR Negative     Barbiturate Ur Negative     Benzodiazepine Urine Negative     Cocaine Urine Negative     Methadone Urine Negative     Opiate Urine Negative     PCP Ur Negative     THC Urine Positive     Oxycodone Urine Negative    Narrative:      Presumptive report  If requested, specimen will be sent to reference lab for confirmation  FOR MEDICAL PURPOSES ONLY  IF CONFIRMATION NEEDED PLEASE CONTACT THE LAB WITHIN 5 DAYS  Drug Screen Cutoff Levels:  AMPHETAMINE/METHAMPHETAMINES  1000 ng/mL  BARBITURATES     200 ng/mL  BENZODIAZEPINES     200 ng/mL  COCAINE      300 ng/mL  METHADONE      300 ng/mL  OPIATES      300 ng/mL  PHENCYCLIDINE     25 ng/mL  THC       50 ng/mL  OXYCODONE      100 ng/mL    Hepatic function panel [[id number]]  (Normal) Collected: 02/27/22 1140    Lab Status: Final result Specimen: Blood from Arm, Left Updated: 02/27/22 1217     Total Bilirubin 0 27 mg/dL      Bilirubin, Direct 0 08 mg/dL      Alkaline Phosphatase 82 U/L      AST 21 U/L      ALT 45 U/L      Total Protein 7 2 g/dL      Albumin 3 6 g/dL     TSH [[id number]]  (Normal) Collected: 02/27/22 1140    Lab Status: Final result Specimen: Blood from Arm, Left Updated: 02/27/22 1217     TSH 3RD GENERATON 1 285 uIU/mL     Narrative:      Patients undergoing fluorescein dye angiography may retain small amounts of fluorescein in the body for 48-72 hours post procedure  Samples containing fluorescein can produce falsely depressed TSH values  If the patient had this procedure,a specimen should be resubmitted post fluorescein clearance        Magnesium [[id number]]  (Normal) Collected: 02/27/22 1140    Lab Status: Final result Specimen: Blood from Arm, Left Updated: 02/27/22 1217     Magnesium 2 1 mg/dL     HS Troponin 0hr (reflex protocol) [[id number]]  (Normal) Collected: 02/27/22 1140    Lab Status: Final result Specimen: Blood from Arm, Left Updated: 02/27/22 1214     hs TnI 0hr 2 ng/L     Basic metabolic panel [[id number]] Collected: 02/27/22 1140    Lab Status: Final result Specimen: Blood from Arm, Left Updated: 02/27/22 1210     Sodium 140 mmol/L      Potassium 3 8 mmol/L Chloride 105 mmol/L      CO2 28 mmol/L      ANION GAP 7 mmol/L      BUN 8 mg/dL      Creatinine 0 88 mg/dL      Glucose 93 mg/dL      Calcium 8 6 mg/dL      eGFR 107 ml/min/1 73sq m     Narrative:      [place] guidelines for Chronic Kidney Disease (CKD):     Stage 1 with normal or high GFR (GFR > 90 mL/min/1 73 square meters)    Stage 2 Mild CKD (GFR = 60-89 mL/min/1 73 square meters)    Stage 3A Moderate CKD (GFR = 45-59 mL/min/1 73 square meters)    Stage 3B Moderate CKD (GFR = 30-44 mL/min/1 73 square meters)    Stage 4 Severe CKD (GFR = 15-29 mL/min/1 73 square meters)    Stage 5 End Stage CKD (GFR <15 mL/min/1 73 square meters)  Note: GFR calculation is accurate only with a steady state creatinine    CBC and differential [[id number]] Collected: 02/27/22 1140    Lab Status: Final result Specimen: Blood from Arm, Left Updated: 02/27/22 1151     WBC 4 62 Thousand/uL      RBC 4 66 Million/uL      Hemoglobin 14 0 g/dL      Hematocrit 41 8 %      MCV 90 fL      MCH 30 0 pg      MCHC 33 5 g/dL      RDW 13 4 %      MPV 10 3 fL      Platelets 596 Thousands/uL      nRBC 0 /100 WBCs      Neutrophils Relative 48 %      Immat GRANS % 0 %      Lymphocytes Relative 41 %      Monocytes Relative 10 %      Eosinophils Relative 1 %      Basophils Relative 0 %      Neutrophils Absolute 2 17 Thousands/µL      Immature Grans Absolute 0 01 Thousand/uL      Lymphocytes Absolute 1 90 Thousands/µL      Monocytes Absolute 0 47 Thousand/µL      Eosinophils Absolute 0 05 Thousand/µL      Basophils Absolute 0 02 Thousands/µL                  CT head without contrast   Final Result by Blake Louie MD (02/27 9665)      1  No acute intracranial abnormality  The etiology of the patient's clinical symptomatology is not identified on this unenhanced scan  2   Partially visualized is a small air-fluid level within the left maxillary sinus  Correlation is recommended to exclude underlying acute sinusitis  The study was marked in [facility] for immediate notification  Workstation performed: [id number]                    Procedures  ECG 12 Lead Documentation Only    Date/Time: 2/27/2022 10:48 AM  Performed by: Lisa Hagen MD  Authorized by: Lisa Hagen MD     Indications / Diagnosis:  Syncope  ECG reviewed by me, the ED Provider: yes    Patient location:  ED  Previous ECG:     Previous ECG:  Compared to current    Comparison ECG info:  02/05/19    Similarity:  No change  Interpretation:     Interpretation: abnormal    Rate:     ECG rate:  66    ECG rate assessment: normal    Rhythm:     Rhythm: sinus rhythm    Ectopy:     Ectopy: none    QRS:     QRS axis:  Normal    QRS intervals:  Normal  Conduction:     Conduction: normal    ST segments:     ST segments:  Abnormal    Elevation:  I, II, III, aVF, V2, V3, V5, V4 and V6  T waves:     T waves: inverted      Inverted:  AVL             ED Course                                             MDM  Number of Diagnoses or Management Options  Seizure-like activity [facility]): new and requires workup  Syncope: new and requires workup  Diagnosis management comments: This is a 20-year-old male presenting today with a syncopal event and concern for possible seizure  He says that yesterday he was with friends, was talking about his daughter, setback in a chair and then woke up on floor  He felt sweaty and "relaxed" afterwards  He denies any preceding chest pain, palpitations, headache, nausea, vision changes, diaphoresis, or other symptoms  He denies any pain or injuries from the fall  The wife says his friends refused to let him drive home, so drove him to their house  She had to go drop the friend back off, and by the time she got back home the patient was asleep  He wanted to see his son's basketball game this morning, so came in for evaluation after it was over    He denies any worsening or changing symptoms since the event, or any complaints at this time  The friend told the wife that the patient slumped over in the chair, fell off to the side and seemed to have whole body shaking for an undetermined period of time  There was no incontinence  She talked to the patient shortly after the event occurred and that he seemed to be fine  She is uncertain of any postictal period  The patient denies recent infectious symptoms, no fevers, URI symptoms, nausea, vomiting, diarrhea, chest pain palpitations, presyncope  He denies daily medications, no new medical problems, alcohol or drug use, over-the-counter medications  He was seen here on 2/5/19 for syncope while on the lomeli chair, and woke up sweaty  Evaluation at that time showed slightly abnormal EKG, concerning for pericarditis  He did follow-up with Cardiology, had a stress test and echocardiogram done that were normal   There was one other episode of syncope several years before that that occurred while the patient was urinating, again without preceding symptoms  He has not had any other episodes of syncope or presyncope  He denies known episodes of palpitations or prior dysrhythmias  The seizure-like activity yesterday was new from prior episodes  The wife says the patient is acting normally now  Review of systems:  Otherwise negative unless stated as above     He is well-appearing, in no acute distress  He has no focal neurologic deficits or signs of trauma on exam   Exam is otherwise unremarkable  Differential does include possible new onset seizure causing him to lose consciousness in the first place, seizures secondary to head trauma versus myoclonic jerks secondary to syncope  Given head trauma and possible seizure, we will get a CT scan to evaluate for acute intracranial injury or space-occupying lesions, check lab work to evaluate for contributing factors, including anemia, infection, electrolyte or thyroid abnormality, dehydration, ACS    EKG obtained in triage is abnormal, but similar to that obtained three years ago  Though concern at last visit was for possible pericarditis, with no recent infectious symptoms, chest pain, I feel that this is more likely to be a chronic process, such as early repolarization  He is any symptoms to suggest this is chronic ischemia I did advise the patient and his wife that he has a chronically slightly abnormal EKG, and he was given a copy it to keep for his own records  CT scan shows possible mild sinusitis, the patient is not having any symptoms suggestive of this  Lab work is unremarkable  An the patient has not had any dysrhythmias or recurrent symptoms while in the emergency department  I discussed with him findings, uncertain exact etiology of symptoms, follow up, and indications for return, and he expresses understanding with this plan  Amount and/or Complexity of Data Reviewed  Clinical lab tests: ordered and reviewed  Tests in the radiology section of CPT®: ordered and reviewed  Decide to obtain previous medical records or to obtain history from someone other than the patient: yes  Obtain history from someone other than the patient: yes (wife)  Review and summarize past medical records: yes  Independent visualization of images, tracings, or specimens: yes        Disposition  Final diagnoses:   Syncope   Seizure-like activity ([facility] [address] )     Time reflects when diagnosis was documented in both MDM as applicable and the Disposition within this note     Time User Action Codes Description Comment    2/27/2022  1:06 PM Keith Hayes Add [R55] Syncope     2/27/2022  1:07 PM Keith Hayes Add [R56 9] Seizure-like activity [facility])       ED Disposition     ED Disposition Condition Date/Time Comment    Discharge Good Sun Feb 27, 2022  1:06 PM Rolly Sanderson Se discharge to home/self care        Follow-up Information     Follow up With Specialties Details Why Contact Info Additional Information    Abel Hernandez Kristina Dobbins Nurse Practitioner Schedule an appointment as soon as possible for a visit  to set up care with a new primary care doctor [place] Bryson [address] [place] [address], DO Family Medicine Schedule an appointment as soon as possible for a visit  to set up care with a new primary care doctor [address] [facility]       Kristy Kawasaki, MD Cardiology Schedule an appointment as soon as possible for a visit  to follow up on your symptoms [address] [facility] [address] [facility] Neurology [address] Neurology Schedule an appointment as soon as possible for a visit  to follow up on your symptoms [address] [facility] [id number]  [address] Neurology [address], [address], [address] [facility]          Discharge Medication List as of 2/27/2022  1:26 PM      CONTINUE these medications which have NOT CHANGED    Details   ibuprofen (MOTRIN) 800 mg tablet Take 1 tablet (800 mg total) by mouth 3 (three) times a day for 14 days, Starting Tue 2/5/2019, Until Tue 2/19/2019, Print             No discharge procedures on file      PDMP Review     None          ED Provider  Electronically Signed by           Lisa Hagen MD  02/27/22 2000

## 2022-02-27 NOTE — DISCHARGE INSTRUCTIONS
It is not uncommon to have shaking activity when you first pass out due to sudden drop in blood pressure, which can be hard to differentiate from a true seizure  Avoid driving until you follow-up  Follow-up again with the cardiologist to discuss whether you need monitoring for irregular heart rhythms  Follow-up with a neurologist for further evaluation  Return if you have recurrent episodes, feel like you are going to pass out, developed irregular heart rhythms, or for any other concerns

## 2022-02-28 ENCOUNTER — OFFICE VISIT (OUTPATIENT)
Dept: NEUROLOGY | Facility: CLINIC | Age: 42
End: 2022-02-28
Payer: COMMERCIAL

## 2022-02-28 VITALS
WEIGHT: 198.4 LBS | SYSTOLIC BLOOD PRESSURE: 98 MMHG | HEIGHT: 69 IN | DIASTOLIC BLOOD PRESSURE: 54 MMHG | BODY MASS INDEX: 29.38 KG/M2

## 2022-02-28 DIAGNOSIS — R55 SYNCOPE: Primary | ICD-10-CM

## 2022-02-28 PROCEDURE — 99204 OFFICE O/P NEW MOD 45 MIN: CPT | Performed by: PSYCHIATRY & NEUROLOGY

## 2022-02-28 NOTE — PROGRESS NOTES
Ryan Benton is a 37 y o  male  who presents with a complaint of syncope    Assessment:  1  Syncope        Plan:  EEG   Follow-up afterwards    Discussion:  Rolly had a syncopal episode 2 days ago with a history of syncope about 3 years ago with a negative cardiac workup  The episode Saturday sounds more cardiovascular than primary neurologic however for completeness sake will obtain an EEG  If negative consider sleep-deprived EEG  He will coordinate follow-up with Cardiology as well and I will see him back in follow-up when these are completed      Subjective:    HPI  Rolly is a right-handed man who presents today accompanied by his wife with the above complaints  He states that Saturday he was at a friend's house and they were talking  He states that he was sitting any may have had a pizza  He states that he suddenly felt off but had a difficult time describing it  He denies any definite lightheadedness or palpitations  He states he then passed out  His friend states that he had some jerking movements  He states when he came around he was very sweaty and felt drained  There was no described postictal confusion  He states he was get able to get up into it he needed to do  They did drive him home  He states that he felt tired and slept the rest of the day  The next day he went to the emergency room  Denied doing anything out of the ordinary the day before and noted no change in diet  He has not had any further symptoms since that time  In the emergency room did have a CT scan of his head done which was unremarkable  He reports a few years ago he was in a lomeli chair and a similar event occurred  He states he underwent a cardiac workup at that time because his EKG demonstrated abnormalities but no significant abnormalities were found  States in between he felt fine  History reviewed  No pertinent past medical history      Family History:  Family History   Problem Relation Age of Onset  Prostate cancer Father     Seizures Cousin        Past Surgical History:  History reviewed  No pertinent surgical history  Social History:   reports that he has never smoked  He has never used smokeless tobacco  He reports current alcohol use  He reports current drug use  Frequency: 7 00 times per week  Drug: Marijuana  Allergies:  Patient has no known allergies  Current Outpatient Medications:     ibuprofen (MOTRIN) 800 mg tablet, Take 1 tablet (800 mg total) by mouth 3 (three) times a day for 14 days, Disp: 42 tablet, Rfl: 0    I have reviewed the past medical, social and family history, current medications, allergies, vitals, review of systems and updated this information as appropriate today     Objective:    Vitals:  Blood pressure 98/54, height 5' 9" (1 753 m), weight 90 kg (198 lb 6 4 oz)  Physical Exam    Neurological Exam    GENERAL:  Cooperative in no acute distress  Well-developed and well-nourished    HEAD and NECK   Head is atraumatic normocephalic with no lesions or masses  Neck is supple with full range of motion    CARDIOVASCULAR  Carotid Arteries-no carotid bruits  NEUROLOGIC:  Mental Status-the patient is awake alert and oriented without aphasia or apraxia  Cranial Nerves: Visual fields are full to confrontation  Extraocular movements are full without nystagmus  Pupils are 2-1/2 mm and reactive  Face is symmetrical to light touch  Movements of facial expression move symmetrically  Hearing is normal to finger rub bilaterally  Soft palate lifts symmetrically  Shoulder shrug is symmetrical  Tongue is midline without atrophy  Motor: No drift is noted on arm extension  Strength is full in the upper and lower extremities with normal bulk and tone  Sensory: Intact to temperature and vibratory sensation in the upper and lower extremities bilaterally  Cortical function is intact  Coordination: Finger to nose testing is performed accurately  Romberg is negative   Gait reveals a normal base with symmetrical arm swing  Tandem walk is normal   Reflexes:  Trace to 1 in the biceps, triceps, brachioradialis, knee jerk and ankle jerk regions  Toes are downgoing            ROS:    Review of Systems   Constitutional: Negative  Negative for appetite change and fever  HENT: Negative  Negative for hearing loss, tinnitus, trouble swallowing and voice change  Eyes: Negative  Negative for photophobia and pain  Respiratory: Negative  Negative for shortness of breath  Cardiovascular: Negative  Negative for palpitations  Gastrointestinal: Negative  Negative for nausea and vomiting  Endocrine: Negative  Negative for cold intolerance  Genitourinary: Negative  Negative for dysuria, frequency and urgency  Musculoskeletal: Negative  Negative for myalgias and neck pain  Full body pain and stiffness  Feels like muscles are tight since hospital visit  Skin: Negative  Negative for rash  Neurological: Positive for seizures, syncope and weakness  Negative for dizziness, tremors, facial asymmetry, speech difficulty, light-headedness, numbness and headaches  Hematological: Negative  Does not bruise/bleed easily  Psychiatric/Behavioral: Positive for sleep disturbance  Negative for confusion and hallucinations          Fatigue

## 2022-07-26 ENCOUNTER — TELEPHONE (OUTPATIENT)
Dept: NEUROLOGY | Facility: CLINIC | Age: 42
End: 2022-07-26

## 2022-07-26 NOTE — TELEPHONE ENCOUNTER
LVM attempting to confirm 8/3 apt with Dr Calos Peralta  Patient had pending EEG needed to be done prior to apt  Please inquire with patient  If this will not be completed prior to 8/3 we may need to reschedule patient to a later date

## 2022-07-27 ENCOUNTER — TELEPHONE (OUTPATIENT)
Dept: NEUROLOGY | Facility: CLINIC | Age: 42
End: 2022-07-27

## 2022-07-27 NOTE — TELEPHONE ENCOUNTER
7/27 LM FOR PT TO CALL - [facility] W/ Two Stephanie Anglin [place]  [address]  I DID NOT WANT TO CANCEL APPT IN CASE HE HAS OTHER INSUR    REQUESTED CALL BACK

## 2022-11-04 ENCOUNTER — TELEPHONE (OUTPATIENT)
Dept: NEUROLOGY | Facility: CLINIC | Age: 42
End: 2022-11-04